# Patient Record
Sex: MALE | Race: WHITE | NOT HISPANIC OR LATINO | Employment: FULL TIME | URBAN - METROPOLITAN AREA
[De-identification: names, ages, dates, MRNs, and addresses within clinical notes are randomized per-mention and may not be internally consistent; named-entity substitution may affect disease eponyms.]

---

## 2019-06-09 ENCOUNTER — HOSPITAL ENCOUNTER (EMERGENCY)
Facility: HOSPITAL | Age: 44
Discharge: HOME | End: 2019-06-09
Attending: EMERGENCY MEDICINE | Admitting: EMERGENCY MEDICINE

## 2019-06-09 ENCOUNTER — APPOINTMENT (OUTPATIENT)
Dept: GENERAL RADIOLOGY | Facility: HOSPITAL | Age: 44
End: 2019-06-09

## 2019-06-09 VITALS
WEIGHT: 180 LBS | OXYGEN SATURATION: 94 % | HEIGHT: 70 IN | DIASTOLIC BLOOD PRESSURE: 111 MMHG | HEART RATE: 78 BPM | BODY MASS INDEX: 25.77 KG/M2 | SYSTOLIC BLOOD PRESSURE: 148 MMHG | TEMPERATURE: 98.7 F | RESPIRATION RATE: 20 BRPM

## 2019-06-09 DIAGNOSIS — R07.9 CHEST PAIN, UNSPECIFIED TYPE: Primary | ICD-10-CM

## 2019-06-09 DIAGNOSIS — K21.9 GASTROESOPHAGEAL REFLUX DISEASE, ESOPHAGITIS PRESENCE NOT SPECIFIED: ICD-10-CM

## 2019-06-09 LAB
ALBUMIN SERPL-MCNC: 4.8 G/DL (ref 3.5–5.2)
ALBUMIN/GLOB SERPL: 1.5 G/DL
ALP SERPL-CCNC: 89 U/L (ref 39–117)
ALT SERPL W P-5'-P-CCNC: 119 U/L (ref 1–41)
ANION GAP SERPL CALCULATED.3IONS-SCNC: 17 MMOL/L
AST SERPL-CCNC: 112 U/L (ref 1–40)
BASOPHILS # BLD AUTO: 0.05 10*3/MM3 (ref 0–0.2)
BASOPHILS NFR BLD AUTO: 1.2 % (ref 0–1.5)
BILIRUB SERPL-MCNC: 0.5 MG/DL (ref 0.2–1.2)
BUN BLD-MCNC: 8 MG/DL (ref 6–20)
BUN/CREAT SERPL: 9.2 (ref 7–25)
CALCIUM SPEC-SCNC: 9.3 MG/DL (ref 8.6–10.5)
CHLORIDE SERPL-SCNC: 100 MMOL/L (ref 98–107)
CO2 SERPL-SCNC: 25 MMOL/L (ref 22–29)
CREAT BLD-MCNC: 0.87 MG/DL (ref 0.76–1.27)
DEPRECATED RDW RBC AUTO: 43.7 FL (ref 37–54)
EOSINOPHIL # BLD AUTO: 0.1 10*3/MM3 (ref 0–0.4)
EOSINOPHIL NFR BLD AUTO: 2.5 % (ref 0.3–6.2)
ERYTHROCYTE [DISTWIDTH] IN BLOOD BY AUTOMATED COUNT: 12.6 % (ref 12.3–15.4)
GFR SERPL CREATININE-BSD FRML MDRD: 97 ML/MIN/1.73
GLOBULIN UR ELPH-MCNC: 3.1 GM/DL
GLUCOSE BLD-MCNC: 101 MG/DL (ref 65–99)
HCT VFR BLD AUTO: 49 % (ref 37.5–51)
HGB BLD-MCNC: 16.9 G/DL (ref 13–17.7)
HOLD SPECIMEN: NORMAL
HOLD SPECIMEN: NORMAL
IMM GRANULOCYTES # BLD AUTO: 0.01 10*3/MM3 (ref 0–0.05)
IMM GRANULOCYTES NFR BLD AUTO: 0.2 % (ref 0–0.5)
LYMPHOCYTES # BLD AUTO: 1.45 10*3/MM3 (ref 0.7–3.1)
LYMPHOCYTES NFR BLD AUTO: 36.1 % (ref 19.6–45.3)
MCH RBC QN AUTO: 32.2 PG (ref 26.6–33)
MCHC RBC AUTO-ENTMCNC: 34.5 G/DL (ref 31.5–35.7)
MCV RBC AUTO: 93.3 FL (ref 79–97)
MONOCYTES # BLD AUTO: 0.61 10*3/MM3 (ref 0.1–0.9)
MONOCYTES NFR BLD AUTO: 15.2 % (ref 5–12)
NEUTROPHILS # BLD AUTO: 1.8 10*3/MM3 (ref 1.7–7)
NEUTROPHILS NFR BLD AUTO: 44.8 % (ref 42.7–76)
NRBC BLD AUTO-RTO: 0 /100 WBC (ref 0–0.2)
NT-PROBNP SERPL-MCNC: 15.5 PG/ML (ref 5–450)
PLATELET # BLD AUTO: 165 10*3/MM3 (ref 140–450)
PMV BLD AUTO: 9.9 FL (ref 6–12)
POTASSIUM BLD-SCNC: 3.8 MMOL/L (ref 3.5–5.2)
PROT SERPL-MCNC: 7.9 G/DL (ref 6–8.5)
RBC # BLD AUTO: 5.25 10*6/MM3 (ref 4.14–5.8)
SODIUM BLD-SCNC: 142 MMOL/L (ref 136–145)
TROPONIN T SERPL-MCNC: <0.01 NG/ML (ref 0–0.03)
TROPONIN T SERPL-MCNC: <0.01 NG/ML (ref 0–0.03)
WBC NRBC COR # BLD: 4.02 10*3/MM3 (ref 3.4–10.8)
WHOLE BLOOD HOLD SPECIMEN: NORMAL
WHOLE BLOOD HOLD SPECIMEN: NORMAL

## 2019-06-09 PROCEDURE — 83880 ASSAY OF NATRIURETIC PEPTIDE: CPT

## 2019-06-09 PROCEDURE — 93005 ELECTROCARDIOGRAM TRACING: CPT | Performed by: EMERGENCY MEDICINE

## 2019-06-09 PROCEDURE — 80053 COMPREHEN METABOLIC PANEL: CPT

## 2019-06-09 PROCEDURE — 84484 ASSAY OF TROPONIN QUANT: CPT

## 2019-06-09 PROCEDURE — 99285 EMERGENCY DEPT VISIT HI MDM: CPT

## 2019-06-09 PROCEDURE — 84484 ASSAY OF TROPONIN QUANT: CPT | Performed by: EMERGENCY MEDICINE

## 2019-06-09 PROCEDURE — 93005 ELECTROCARDIOGRAM TRACING: CPT

## 2019-06-09 PROCEDURE — 85025 COMPLETE CBC W/AUTO DIFF WBC: CPT

## 2019-06-09 PROCEDURE — 71045 X-RAY EXAM CHEST 1 VIEW: CPT

## 2019-06-09 RX ORDER — SODIUM CHLORIDE 0.9 % (FLUSH) 0.9 %
10 SYRINGE (ML) INJECTION AS NEEDED
Status: DISCONTINUED | OUTPATIENT
Start: 2019-06-09 | End: 2019-06-10 | Stop reason: HOSPADM

## 2019-06-09 RX ORDER — FAMOTIDINE 20 MG/1
20 TABLET, FILM COATED ORAL 2 TIMES DAILY
Qty: 14 TABLET | Refills: 0 | Status: SHIPPED | OUTPATIENT
Start: 2019-06-09

## 2019-06-09 RX ORDER — ALUMINA, MAGNESIA, AND SIMETHICONE 2400; 2400; 240 MG/30ML; MG/30ML; MG/30ML
15 SUSPENSION ORAL ONCE
Status: COMPLETED | OUTPATIENT
Start: 2019-06-09 | End: 2019-06-09

## 2019-06-09 RX ADMIN — ALUMINUM HYDROXIDE, MAGNESIUM HYDROXIDE, AND DIMETHICONE 15 ML: 400; 400; 40 SUSPENSION ORAL at 19:44

## 2019-06-09 RX ADMIN — LIDOCAINE HYDROCHLORIDE 15 ML: 20 SOLUTION ORAL; TOPICAL at 19:45

## 2019-06-09 NOTE — ED PROVIDER NOTES
Karena Brooks is a 43 y.o.male who presents to the emergency department with complaints of shortness of breath. His shortness of breath has been waxing and waning for 3-4 days. He mentions chest pain that is intermittent. The patient has a history of hypertension, hyperlipidemia, and denies history of deep vein thrombosis, and pulmonary embolism. He reports that his father had a myocardial infarction when he was 60 years old. The patient smokes 3/4 a pack of cigarettes and drinks a pint of alcohol a day. He does not take any medications. There are no other acute complaints at this time.           History provided by:  Patient  Shortness of Breath   Severity:  Moderate  Onset quality:  Sudden  Duration:  4 days  Timing:  Intermittent  Progression:  Waxing and waning  Chronicity:  New  Associated symptoms: chest pain    Risk factors: alcohol use    Risk factors: no hx of PE/DVT        Review of Systems   Respiratory: Positive for shortness of breath.    Cardiovascular: Positive for chest pain.   All other systems reviewed and are negative.      Past Medical History:   Diagnosis Date   • Alcohol abuse    • Anxiety    • Hyperlipidemia    • Hypertension        No Known Allergies    Past Surgical History:   Procedure Laterality Date   • HERNIA REPAIR         History reviewed. No pertinent family history.    Social History     Socioeconomic History   • Marital status: Single     Spouse name: Not on file   • Number of children: Not on file   • Years of education: Not on file   • Highest education level: Not on file   Tobacco Use   • Smoking status: Current Every Day Smoker     Packs/day: 1.00     Types: Cigarettes   • Smokeless tobacco: Never Used   Substance and Sexual Activity   • Alcohol use: Yes     Comment: 1/2 [place] DAILY.    • Drug use: No   • Sexual activity: Yes     Partners: Female         Objective   Physical Exam   Constitutional: He is oriented to person, place, and time. He appears  well-developed and well-nourished. No distress.   HENT:   Head: Normocephalic and atraumatic.   Nose: Nose normal.   Eyes: Conjunctivae are normal. No scleral icterus.   Neck: Normal range of motion. Neck supple.   Cardiovascular: Normal rate, regular rhythm and normal heart sounds.   Pulmonary/Chest: Effort normal and breath sounds normal. No respiratory distress.   Abdominal: Soft. There is no tenderness.   Musculoskeletal: Normal range of motion.   Neurological: He is alert and oriented to person, place, and time.   Skin: Skin is warm and dry.   Psychiatric: He has a normal mood and affect. His behavior is normal.   Nursing note and vitals reviewed.      Procedures         ED Course  ED Course as of Jun 09 2307   Sun Jun 09, 2019 2300 I discussed the plan with the patient  [JI]   2306 HEART SCORE 1  [JI]      ED Course User Index  [JI] Michael Chaudhari PA     Recent Results (from the past 24 hour(s))   Comprehensive Metabolic Panel    Collection Time: 06/09/19  7:55 PM   Result Value Ref Range    Glucose 101 (H) 65 - 99 mg/dL    BUN 8 6 - 20 mg/dL    Creatinine 0.87 0.76 - 1.27 mg/dL    Sodium 142 136 - 145 mmol/L    Potassium 3.8 3.5 - 5.2 mmol/L    Chloride 100 98 - 107 mmol/L    CO2 25.0 22.0 - 29.0 mmol/L    Calcium 9.3 8.6 - 10.5 mg/dL    Total Protein 7.9 6.0 - 8.5 g/dL    Albumin 4.80 3.50 - 5.20 g/dL    ALT (SGPT) 119 (H) 1 - 41 U/L    AST (SGOT) 112 (H) 1 - 40 U/L    Alkaline Phosphatase 89 39 - 117 U/L    Total Bilirubin 0.5 0.2 - 1.2 mg/dL    eGFR Non African Amer 97 >60 mL/min/1.73    Globulin 3.1 gm/dL    A/G Ratio 1.5 g/dL    BUN/Creatinine Ratio 9.2 7.0 - 25.0    Anion Gap 17.0 mmol/L   BNP    Collection Time: 06/09/19  7:55 PM   Result Value Ref Range    proBNP 15.5 5.0 - 450.0 pg/mL   Troponin    Collection Time: 06/09/19  7:55 PM   Result Value Ref Range    Troponin T <0.010 0.000 - 0.030 ng/mL   Light Blue Top    Collection Time: 06/09/19  7:55 PM   Result Value Ref Range    Extra Tube hold for  add-on    Green Top (Gel)    Collection Time: 06/09/19  7:55 PM   Result Value Ref Range    Extra Tube Hold for add-ons.    Lavender Top    Collection Time: 06/09/19  7:55 PM   Result Value Ref Range    Extra Tube hold for add-on    Gold Top - SST    Collection Time: 06/09/19  7:55 PM   Result Value Ref Range    Extra Tube Hold for add-ons.    CBC Auto Differential    Collection Time: 06/09/19  7:55 PM   Result Value Ref Range    WBC 4.02 3.40 - 10.80 10*3/mm3    RBC 5.25 4.14 - 5.80 10*6/mm3    Hemoglobin 16.9 13.0 - 17.7 g/dL    Hematocrit 49.0 37.5 - 51.0 %    MCV 93.3 79.0 - 97.0 fL    MCH 32.2 26.6 - 33.0 pg    MCHC 34.5 31.5 - 35.7 g/dL    RDW 12.6 12.3 - 15.4 %    RDW-SD 43.7 37.0 - 54.0 fl    MPV 9.9 6.0 - 12.0 fL    Platelets 165 140 - 450 10*3/mm3    Neutrophil % 44.8 42.7 - 76.0 %    Lymphocyte % 36.1 19.6 - 45.3 %    Monocyte % 15.2 (H) 5.0 - 12.0 %    Eosinophil % 2.5 0.3 - 6.2 %    Basophil % 1.2 0.0 - 1.5 %    Immature Grans % 0.2 0.0 - 0.5 %    Neutrophils, Absolute 1.80 1.70 - 7.00 10*3/mm3    Lymphocytes, Absolute 1.45 0.70 - 3.10 10*3/mm3    Monocytes, Absolute 0.61 0.10 - 0.90 10*3/mm3    Eosinophils, Absolute 0.10 0.00 - 0.40 10*3/mm3    Basophils, Absolute 0.05 0.00 - 0.20 10*3/mm3    Immature Grans, Absolute 0.01 0.00 - 0.05 10*3/mm3    nRBC 0.0 0.0 - 0.2 /100 WBC   Troponin    Collection Time: 06/09/19  9:49 PM   Result Value Ref Range    Troponin T <0.010 0.000 - 0.030 ng/mL     Note: In addition to lab results from this visit, the labs listed above may include labs taken at another facility or during a different encounter within the last 24 hours. Please correlate lab times with ED admission and discharge times for further clarification of the services performed during this visit.    XR Chest 1 View   Final Result   No acute cardiopulmonary findings.      Signer Name: Gael Sheets MD    Signed: 6/9/2019 7:51 PM    Workstation Name: [id number]            Vitals:    06/09/19 1920 06/09/19  1956 06/09/19 2109 06/09/19 2152   BP: (!) 147/111 (!) 139/107 (!) 151/104 (!) 137/105   BP Location:    Right arm   Patient Position:    Sitting   Pulse: 51 80 79 72   Resp:    20   Temp:       TempSrc:       SpO2: 95%  96% 96%   Weight:       Height:         Medications   sodium chloride 0.9 % flush 10 mL (not administered)   nitroglycerin (NITROSTAT) ointment 1 inch (not administered)   aluminum-magnesium hydroxide-simethicone (MAALOX MAX) 400-400-40 MG/5ML suspension 15 mL (15 mL Oral Given 6/9/19 1944)   lidocaine viscous (XYLOCAINE) 2 % mouth solution 15 mL (15 mL Mouth/Throat Given 6/9/19 1945)     ECG/EMG Results (last 24 hours)     Procedure Component Value Units Date/Time    ECG 12 Lead [[id number]] Collected:  06/09/19 1903     Updated:  06/09/19 1903        ECG 12 Lead         ECG 12 Lead             Recent Results (from the past 24 hour(s))   Comprehensive Metabolic Panel    Collection Time: 06/09/19  7:55 PM   Result Value Ref Range    Glucose 101 (H) 65 - 99 mg/dL    BUN 8 6 - 20 mg/dL    Creatinine 0.87 0.76 - 1.27 mg/dL    Sodium 142 136 - 145 mmol/L    Potassium 3.8 3.5 - 5.2 mmol/L    Chloride 100 98 - 107 mmol/L    CO2 25.0 22.0 - 29.0 mmol/L    Calcium 9.3 8.6 - 10.5 mg/dL    Total Protein 7.9 6.0 - 8.5 g/dL    Albumin 4.80 3.50 - 5.20 g/dL    ALT (SGPT) 119 (H) 1 - 41 U/L    AST (SGOT) 112 (H) 1 - 40 U/L    Alkaline Phosphatase 89 39 - 117 U/L    Total Bilirubin 0.5 0.2 - 1.2 mg/dL    eGFR Non African Amer 97 >60 mL/min/1.73    Globulin 3.1 gm/dL    A/G Ratio 1.5 g/dL    BUN/Creatinine Ratio 9.2 7.0 - 25.0    Anion Gap 17.0 mmol/L   BNP    Collection Time: 06/09/19  7:55 PM   Result Value Ref Range    proBNP 15.5 5.0 - 450.0 pg/mL   Troponin    Collection Time: 06/09/19  7:55 PM   Result Value Ref Range    Troponin T <0.010 0.000 - 0.030 ng/mL   Light Blue Top    Collection Time: 06/09/19  7:55 PM   Result Value Ref Range    Extra Tube hold for add-on    Green Top (Gel)    Collection Time:  06/09/19  7:55 PM   Result Value Ref Range    Extra Tube Hold for add-ons.    Lavender Top    Collection Time: 06/09/19  7:55 PM   Result Value Ref Range    Extra Tube hold for add-on    Gold Top - SST    Collection Time: 06/09/19  7:55 PM   Result Value Ref Range    Extra Tube Hold for add-ons.    CBC Auto Differential    Collection Time: 06/09/19  7:55 PM   Result Value Ref Range    WBC 4.02 3.40 - 10.80 10*3/mm3    RBC 5.25 4.14 - 5.80 10*6/mm3    Hemoglobin 16.9 13.0 - 17.7 g/dL    Hematocrit 49.0 37.5 - 51.0 %    MCV 93.3 79.0 - 97.0 fL    MCH 32.2 26.6 - 33.0 pg    MCHC 34.5 31.5 - 35.7 g/dL    RDW 12.6 12.3 - 15.4 %    RDW-SD 43.7 37.0 - 54.0 fl    MPV 9.9 6.0 - 12.0 fL    Platelets 165 140 - 450 10*3/mm3    Neutrophil % 44.8 42.7 - 76.0 %    Lymphocyte % 36.1 19.6 - 45.3 %    Monocyte % 15.2 (H) 5.0 - 12.0 %    Eosinophil % 2.5 0.3 - 6.2 %    Basophil % 1.2 0.0 - 1.5 %    Immature Grans % 0.2 0.0 - 0.5 %    Neutrophils, Absolute 1.80 1.70 - 7.00 10*3/mm3    Lymphocytes, Absolute 1.45 0.70 - 3.10 10*3/mm3    Monocytes, Absolute 0.61 0.10 - 0.90 10*3/mm3    Eosinophils, Absolute 0.10 0.00 - 0.40 10*3/mm3    Basophils, Absolute 0.05 0.00 - 0.20 10*3/mm3    Immature Grans, Absolute 0.01 0.00 - 0.05 10*3/mm3    nRBC 0.0 0.0 - 0.2 /100 WBC   Troponin    Collection Time: 06/09/19  9:49 PM   Result Value Ref Range    Troponin T <0.010 0.000 - 0.030 ng/mL     Note: In addition to lab results from this visit, the labs listed above may include labs taken at another facility or during a different encounter within the last 24 hours. Please correlate lab times with ED admission and discharge times for further clarification of the services performed during this visit.    XR Chest 1 View   Final Result   No acute cardiopulmonary findings.      Signer Name: Gael Sheets MD    Signed: 6/9/2019 7:51 PM    Workstation Name: [id number]            Vitals:    06/09/19 1920 06/09/19 1956 06/09/19 2109 06/09/19 2152   BP: (!)  147/111 (!) 139/107 (!) 151/104 (!) 137/105   BP Location:    Right arm   Patient Position:    Sitting   Pulse: 51 80 79 72   Resp:    20   Temp:       TempSrc:       SpO2: 95%  96% 96%   Weight:       Height:         Medications   sodium chloride 0.9 % flush 10 mL (not administered)   nitroglycerin (NITROSTAT) ointment 1 inch (not administered)   aluminum-magnesium hydroxide-simethicone (MAALOX MAX) 400-400-40 MG/5ML suspension 15 mL (15 mL Oral Given 6/9/19 1944)   lidocaine viscous (XYLOCAINE) 2 % mouth solution 15 mL (15 mL Mouth/Throat Given 6/9/19 1945)     ECG/EMG Results (last 24 hours)     Procedure Component Value Units Date/Time    ECG 12 Lead [[id number]] Collected:  06/09/19 1903     Updated:  06/09/19 1903    ECG 12 Lead [[id number]] Collected:  06/09/19 2147     Updated:  06/09/19 2147        ECG 12 Lead         ECG 12 Lead                             MDM    Final diagnoses:   Chest pain, unspecified type   Gastroesophageal reflux disease, esophagitis presence not specified       Documentation assistance provided by alexandra Lamas.  Information recorded by the alexandra was done at my direction and has been verified and validated by me.     Baldomero Lamas  06/09/19 1934       Michael Chaudhari PA  06/09/19 2304       Michael Chaudhari PA  06/09/19 2307

## 2019-06-10 NOTE — DISCHARGE INSTRUCTIONS
Take an enteric-coated aspirin 81 mg daily.  You must check your blood pressure daily, take your blood pressure  medication as prescribed and follow-up with your doctor as additional treatment may be required.

## 2019-06-14 ENCOUNTER — APPOINTMENT (OUTPATIENT)
Dept: LAB | Facility: HOSPITAL | Age: 44
End: 2019-06-14

## 2019-06-14 ENCOUNTER — HOSPITAL ENCOUNTER (OUTPATIENT)
Dept: CARDIOLOGY | Facility: HOSPITAL | Age: 44
Discharge: HOME | End: 2019-06-14
Admitting: NURSE PRACTITIONER

## 2019-06-14 ENCOUNTER — LAB (OUTPATIENT)
Dept: LAB | Facility: HOSPITAL | Age: 44
End: 2019-06-14

## 2019-06-14 ENCOUNTER — OFFICE VISIT (OUTPATIENT)
Dept: CARDIOLOGY | Facility: HOSPITAL | Age: 44
End: 2019-06-14

## 2019-06-14 VITALS
BODY MASS INDEX: 25.95 KG/M2 | DIASTOLIC BLOOD PRESSURE: 93 MMHG | OXYGEN SATURATION: 98 % | WEIGHT: 181.25 LBS | SYSTOLIC BLOOD PRESSURE: 137 MMHG | RESPIRATION RATE: 20 BRPM | HEART RATE: 87 BPM | TEMPERATURE: 97.9 F | HEIGHT: 70 IN

## 2019-06-14 DIAGNOSIS — R07.9 CHEST PAIN, UNSPECIFIED TYPE: ICD-10-CM

## 2019-06-14 DIAGNOSIS — K21.9 GASTROESOPHAGEAL REFLUX DISEASE, ESOPHAGITIS PRESENCE NOT SPECIFIED: ICD-10-CM

## 2019-06-14 DIAGNOSIS — R00.2 PALPITATIONS: ICD-10-CM

## 2019-06-14 DIAGNOSIS — R06.02 SHORTNESS OF BREATH: ICD-10-CM

## 2019-06-14 DIAGNOSIS — R07.9 CHEST PAIN, UNSPECIFIED TYPE: Primary | ICD-10-CM

## 2019-06-14 DIAGNOSIS — R94.39 EQUIVOCAL STRESS TEST: ICD-10-CM

## 2019-06-14 DIAGNOSIS — F10.10 ALCOHOL ABUSE: ICD-10-CM

## 2019-06-14 DIAGNOSIS — I10 ESSENTIAL HYPERTENSION: ICD-10-CM

## 2019-06-14 DIAGNOSIS — R06.83 SNORING: ICD-10-CM

## 2019-06-14 DIAGNOSIS — E78.5 HYPERLIPIDEMIA, UNSPECIFIED HYPERLIPIDEMIA TYPE: ICD-10-CM

## 2019-06-14 DIAGNOSIS — I10 ESSENTIAL HYPERTENSION: Primary | ICD-10-CM

## 2019-06-14 LAB
BH CV STRESS BP STAGE 1: NORMAL
BH CV STRESS BP STAGE 2: NORMAL
BH CV STRESS DURATION MIN STAGE 1: 3
BH CV STRESS DURATION MIN STAGE 2: 2
BH CV STRESS DURATION SEC STAGE 1: 0
BH CV STRESS DURATION SEC STAGE 2: 1
BH CV STRESS GRADE STAGE 1: 10
BH CV STRESS GRADE STAGE 2: 12
BH CV STRESS HR STAGE 1: 125
BH CV STRESS HR STAGE 2: 141
BH CV STRESS METS STAGE 1: 5
BH CV STRESS METS STAGE 2: 7.5
BH CV STRESS PROTOCOL 1: NORMAL
BH CV STRESS RECOVERY BP: NORMAL MMHG
BH CV STRESS RECOVERY HR: 92 BPM
BH CV STRESS SPEED STAGE 1: 1.7
BH CV STRESS SPEED STAGE 2: 2.5
BH CV STRESS STAGE 1: 1
BH CV STRESS STAGE 2: 2
CHOLEST SERPL-MCNC: 226 MG/DL (ref 0–200)
HDLC SERPL-MCNC: 67 MG/DL (ref 40–60)
LDLC SERPL CALC-MCNC: 141 MG/DL (ref 0–100)
LDLC/HDLC SERPL: 2.11 {RATIO}
MAXIMAL PREDICTED HEART RATE: 179 BPM
PERCENT MAX PREDICTED HR: 78.77 %
STRESS BASELINE BP: NORMAL MMHG
STRESS BASELINE HR: 90 BPM
STRESS PERCENT HR: 93 %
STRESS POST ESTIMATED WORKLOAD: 7 METS
STRESS POST EXERCISE DUR MIN: 5 MIN
STRESS POST EXERCISE DUR SEC: 1 SEC
STRESS POST PEAK BP: NORMAL MMHG
STRESS POST PEAK HR: 141 BPM
STRESS TARGET HR: 152 BPM
TRIGL SERPL-MCNC: 89 MG/DL (ref 0–150)
VLDLC SERPL-MCNC: 17.8 MG/DL (ref 5–40)

## 2019-06-14 PROCEDURE — 99214 OFFICE O/P EST MOD 30 MIN: CPT | Performed by: NURSE PRACTITIONER

## 2019-06-14 PROCEDURE — 93018 CV STRESS TEST I&R ONLY: CPT | Performed by: INTERNAL MEDICINE

## 2019-06-14 PROCEDURE — 80061 LIPID PANEL: CPT

## 2019-06-14 PROCEDURE — 93017 CV STRESS TEST TRACING ONLY: CPT

## 2019-06-14 PROCEDURE — 36415 COLL VENOUS BLD VENIPUNCTURE: CPT

## 2019-06-14 RX ORDER — LISINOPRIL 10 MG/1
10 TABLET ORAL DAILY
Refills: 0 | COMMUNITY
Start: 2019-06-11

## 2019-06-14 RX ORDER — ATORVASTATIN CALCIUM 10 MG/1
10 TABLET, FILM COATED ORAL DAILY
Refills: 0 | COMMUNITY
Start: 2019-06-11

## 2019-06-14 RX ORDER — AMOXICILLIN AND CLAVULANATE POTASSIUM 875; 125 MG/1; MG/1
1 TABLET, FILM COATED ORAL 2 TIMES DAILY
Refills: 0 | COMMUNITY
Start: 2019-06-11 | End: 2019-06-21

## 2019-06-14 RX ORDER — VENLAFAXINE HYDROCHLORIDE 37.5 MG/1
37.5 CAPSULE, EXTENDED RELEASE ORAL DAILY
Refills: 1 | COMMUNITY
Start: 2019-06-11

## 2019-06-14 RX ORDER — CLONAZEPAM 1 MG/1
1 TABLET ORAL 2 TIMES DAILY PRN
Refills: 1 | COMMUNITY
Start: 2019-06-11

## 2019-06-14 RX ORDER — METOPROLOL SUCCINATE 25 MG/1
25 TABLET, EXTENDED RELEASE ORAL DAILY
Qty: 30 TABLET | Refills: 3 | Status: SHIPPED | OUTPATIENT
Start: 2019-06-14 | End: 2019-06-19 | Stop reason: SDUPTHER

## 2019-06-14 RX ORDER — BUSPIRONE HYDROCHLORIDE 5 MG/1
5 TABLET ORAL 3 TIMES DAILY PRN
Refills: 1 | COMMUNITY
Start: 2019-06-11

## 2019-06-14 NOTE — PROGRESS NOTES
Called and reviewed stress test    Equivocal study.  Test stopped due to blood pressure response    Patient will add metoprolol XL 25 mg daily.     Reviewed lipid panel.  Elevated, but liver enzymes are elevated.  No baseline.  History of alcohol abuse.  Will hold off on statin at this time.  Request previous liver enzyme results from primary care provider.    Patient has had nausea, dyspepsia, heartburn-like symptoms.  Patient instructed to take acid reducer daily.  Will then consider adding aspirin.    Patient will be scheduled for echocardiogram.  Will schedule follow-up with [facility] for continued management.

## 2019-06-14 NOTE — PROGRESS NOTES
[facility]      Encounter Date:06/14/2019     [address] [place] [address]  [unfilled]    1975    Ricco Minor MD    Mata Brooks is a 43 y.o. male.      Subjective:     Chief Complaint:  Chest Pain       HPI     43-year-old female who presented to [facility] ED on 6/9/2019 with complaints of shortness of breath.  Intermittent, waxing and waning for greater than a week.  Associated with intermittent chest discomfort/pain, diaphoresis.   Pt reports about 3 weeks ago he had been working out in the heat starting vomiting.  Reported did not feel well for several days.    History of hypertension, hyperlipidemia.  No history of DVT, CVA, PE, arrhythmia.  Father had MI at age 60.  Patient smokes 3/4 pack a day and drinks a pint of alcohol a day.  Typically no medications.  Prescribed Pepcid during ED visit.  Recently started on meds for anxiety.  Report occasional dizziness and palpitations.  Not associated with recent CP and dyspnea. NO hx of arrhythmias.  Hx of snoring.  NO sleep study.       Cardiac risk factors:  History of dyslipidemia, hypertension,  Tobacco use  Daily ETOH use    Past Medical History:   Diagnosis Date   • Alcohol abuse    • Anxiety    • Hyperlipidemia    • Hypertension            Past Surgical History:   Procedure Laterality Date   • HERNIA REPAIR         No Known Allergies      Current Outpatient Medications:   •  amoxicillin-clavulanate (AUGMENTIN) 875-125 MG per tablet, Take 1 tablet by mouth 2 (Two) Times a Day., Disp: , Rfl: 0  •  atorvastatin (LIPITOR) 10 MG tablet, Take 10 mg by mouth Daily., Disp: , Rfl: 0  •  busPIRone (BUSPAR) 5 MG tablet, Take 5 mg by mouth 3 (Three) Times a Day As Needed., Disp: , Rfl: 1  •  clonazePAM (KlonoPIN) 1 MG tablet, Take 1 mg by mouth 2 (Two) Times a Day As Needed., Disp: , Rfl: 1  •  lisinopril (PRINIVIL,ZESTRIL) 10 MG tablet, Take 10 mg by mouth Daily., Disp: , Rfl: 0  •   "venlafaxine XR (EFFEXOR-XR) 37.5 MG 24 hr capsule, Take 37.5 mg by mouth Daily., Disp: , Rfl: 1  •  famotidine (PEPCID) 20 MG tablet, Take 1 tablet by mouth 2 (Two) Times a Day., Disp: 14 tablet, Rfl: 0    The following portions of the patient's history were reviewed and updated today during office visit as appropriate: allergies, current medications, past family history, past medical history, past social history, past surgical history and problem list.    Review of Systems   Cardiovascular: Positive for chest pain.   Respiratory: Positive for shortness of breath.    Gastrointestinal: Positive for heartburn.   All other systems reviewed and are negative.      Objective:     Vitals:    06/14/19 0917 06/14/19 0919 06/14/19 0920   BP: 145/91 142/89 137/93   BP Location: Right arm Left arm Left arm   Patient Position: Sitting Sitting Standing   Cuff Size: Adult Adult Adult   Pulse: 75 72 87   Resp: 20     Temp: 97.9 °F (36.6 °C)     TempSrc: Temporal     SpO2: 95%  98%   Weight: 82.2 kg (181 lb 4 oz)     Height: 177.8 cm (70\")           Physical Exam   Constitutional: He is oriented to person, place, and time. He appears well-developed and well-nourished. No distress.   Eyes: No scleral icterus.   Neck: No hepatojugular reflux and no JVD present. Carotid bruit is not present. No tracheal deviation present. No thyromegaly present.   Cardiovascular: Normal rate, regular rhythm, normal heart sounds and intact distal pulses. Exam reveals no friction rub.   No murmur heard.  Pulmonary/Chest: Effort normal and breath sounds normal.   Abdominal: Soft. Bowel sounds are normal. He exhibits no distension. There is no tenderness.   Musculoskeletal: He exhibits no edema.   Lymphadenopathy:     He has no cervical adenopathy.   Neurological: He is alert and oriented to person, place, and time.   Skin: Skin is warm, dry and intact. No rash noted. No cyanosis or erythema. No pallor.   Psychiatric: He has a normal mood and affect. His " behavior is normal. Thought content normal.   Vitals reviewed.      Lab and Diagnostic Review:  Admission on 06/09/2019, Discharged on 06/09/2019   Component Date Value Ref Range Status   • Glucose 06/09/2019 101* 65 - 99 mg/dL Final   • BUN 06/09/2019 8  6 - 20 mg/dL Final   • Creatinine 06/09/2019 0.87  0.76 - 1.27 mg/dL Final   • Sodium 06/09/2019 142  136 - 145 mmol/L Final   • Potassium 06/09/2019 3.8  3.5 - 5.2 mmol/L Final   • Chloride 06/09/2019 100  98 - 107 mmol/L Final   • CO2 06/09/2019 25.0  22.0 - 29.0 mmol/L Final   • Calcium 06/09/2019 9.3  8.6 - 10.5 mg/dL Final   • Total Protein 06/09/2019 7.9  6.0 - 8.5 g/dL Final   • Albumin 06/09/2019 4.80  3.50 - 5.20 g/dL Final   • ALT (SGPT) 06/09/2019 119* 1 - 41 U/L Final   • AST (SGOT) 06/09/2019 112* 1 - 40 U/L Final   • Alkaline Phosphatase 06/09/2019 89  39 - 117 U/L Final   • Total Bilirubin 06/09/2019 0.5  0.2 - 1.2 mg/dL Final   • eGFR Non African Amer 06/09/2019 97  >60 mL/min/1.73 Final   • Globulin 06/09/2019 3.1  gm/dL Final   • A/G Ratio 06/09/2019 1.5  g/dL Final   • BUN/Creatinine Ratio 06/09/2019 9.2  7.0 - 25.0 Final   • Anion Gap 06/09/2019 17.0  mmol/L Final   • proBNP 06/09/2019 15.5  5.0 - 450.0 pg/mL Final   • Troponin T 06/09/2019 <0.010  0.000 - 0.030 ng/mL Final   • Extra Tube 06/09/2019 hold for add-on   Final    Auto resulted   • Extra Tube 06/09/2019 Hold for add-ons.   Final    Auto resulted.   • Extra Tube 06/09/2019 hold for add-on   Final    Auto resulted   • Extra Tube 06/09/2019 Hold for add-ons.   Final    Auto resulted.   • WBC 06/09/2019 4.02  3.40 - 10.80 10*3/mm3 Final   • RBC 06/09/2019 5.25  4.14 - 5.80 10*6/mm3 Final   • Hemoglobin 06/09/2019 16.9  13.0 - 17.7 g/dL Final   • Hematocrit 06/09/2019 49.0  37.5 - 51.0 % Final   • MCV 06/09/2019 93.3  79.0 - 97.0 fL Final   • MCH 06/09/2019 32.2  26.6 - 33.0 pg Final   • MCHC 06/09/2019 34.5  31.5 - 35.7 g/dL Final   • RDW 06/09/2019 12.6  12.3 - 15.4 % Final   • RDW-SD  06/09/2019 43.7  37.0 - 54.0 fl Final   • MPV 06/09/2019 9.9  6.0 - 12.0 fL Final   • Platelets 06/09/2019 165  140 - 450 10*3/mm3 Final   • Neutrophil % 06/09/2019 44.8  42.7 - 76.0 % Final   • Lymphocyte % 06/09/2019 36.1  19.6 - 45.3 % Final   • Monocyte % 06/09/2019 15.2* 5.0 - 12.0 % Final   • Eosinophil % 06/09/2019 2.5  0.3 - 6.2 % Final   • Basophil % 06/09/2019 1.2  0.0 - 1.5 % Final   • Immature Grans % 06/09/2019 0.2  0.0 - 0.5 % Final   • Neutrophils, Absolute 06/09/2019 1.80  1.70 - 7.00 10*3/mm3 Final   • Lymphocytes, Absolute 06/09/2019 1.45  0.70 - 3.10 10*3/mm3 Final   • Monocytes, Absolute 06/09/2019 0.61  0.10 - 0.90 10*3/mm3 Final   • Eosinophils, Absolute 06/09/2019 0.10  0.00 - 0.40 10*3/mm3 Final   • Basophils, Absolute 06/09/2019 0.05  0.00 - 0.20 10*3/mm3 Final   • Immature Grans, Absolute 06/09/2019 0.01  0.00 - 0.05 10*3/mm3 Final   • nRBC 06/09/2019 0.0  0.0 - 0.2 /100 WBC Final   • Troponin T 06/09/2019 <0.010  0.000 - 0.030 ng/mL Final     EKG 6/9/2019: Normal sinus rhythm with sinus arrhythmia 68 bpm,     6/9/2019 chest x-ray: No acute cardiopulmonary process  Assessment and Plan:         1. Chest pain, unspecified type    - Treadmill Stress Test; Future    2. Shortness of breath    - Treadmill Stress Test; Future  -echo  3. Essential hypertension  Elevated today  Lisinopril    4. Hyperlipidemia, unspecified hyperlipidemia type  Not treated  - Lipid Panel; Future    5. Alcohol abuse  Discussed risk factors associated with daily ETOH use  Pt is working with PCP for treatment of stress/anxiety  Encouraged to decrease daily consumption     6. Gastroesophageal reflux disease, esophagitis presence not specified  Continue H2 blocker    7. Palpitations and dizziness  May need holter/event to assess for arrythmias    8.  Hx of snoring  ? DAXA.  May need sleep study. To discuss at f/u.     F/u 6 weeks or sooner if needed.         *Please note that portions of this note were completed with a  voice recognition program. Efforts were made to edit the dictations, but occasionally words are mistranscribed.

## 2019-06-17 ENCOUNTER — DOCUMENTATION (OUTPATIENT)
Dept: CARDIOLOGY | Facility: HOSPITAL | Age: 44
End: 2019-06-17

## 2019-06-17 NOTE — PROGRESS NOTES
Kevin Brooksphoebe [place]a   Male, 43 y.o., 1975  Weight:   82.2 kg (181 lb 4 oz)  Phone:   [phone] (H)  PCP:   Ricco Minor MD  MRN:   [id number]  MyChart:   Pending  Next Appt:   07/19/2019  Message   Received: 3 days ago   Message Contents   Awilda Colón APRN Morrison, Shanell LUCERO, MA             Last cbc, cmp, tsh, lipids, magnesium from PCP for review.    Comments     6/17/19 0820 Requested labs from PCP    0833 Received and given to Isatu's inbox

## 2019-06-18 ENCOUNTER — TELEPHONE (OUTPATIENT)
Dept: CARDIOLOGY | Facility: HOSPITAL | Age: 44
End: 2019-06-18

## 2019-06-18 ENCOUNTER — DOCUMENTATION (OUTPATIENT)
Dept: CARDIOLOGY | Facility: HOSPITAL | Age: 44
End: 2019-06-18

## 2019-06-18 PROBLEM — R07.9 CHEST PAIN: Status: ACTIVE | Noted: 2019-06-18

## 2019-06-18 NOTE — TELEPHONE ENCOUNTER
Pt will be scheduled f/u with Dr. Damico.  Echo be schedules same day if possible due to travel concerns.     ----- Message from Fernando Damico III, MD sent at 6/14/2019  4:41 PM EDT -----  Let us wait till I see him in the office.  His EKG changes were likely due to hypertension.  ----- Message -----  From: Awilda Colón APRN  Sent: 6/14/2019   4:09 PM  To: Fernando Damico III, MD    I saw stress test results.  Equivocal study.  Test stopped due to elevated blood pressure.    I am going to start beta-blocker for palpitations, blood pressure,Chest pain.  I am holding off on a statin due to elevated liver enzymes and alcohol use.  I am ordering an echo.  I am also holding off on the aspirin right now because of his nausea.  I have instructed him to take his Pepcid or PPI daily.  Would you want me to schedule a nuclear stress test or weight until you see him in clinic?

## 2019-06-18 NOTE — PROGRESS NOTES
Received labs completed by primary care provider on 6/11/2019    Estimated , glucose 109, sodium 138, potassium 3.9, chloride 97, carbon DEXA 23, BUN 7, creatinine 0.9, , , alkaline Selina 104    Hepatitis panel negative    WBC 3.2, RBC 5.1, hemoglobin 15.5, hematocrit 49.2    Free T4 1.3, TSH 2.5    Iron saturation 38%, iron 123, total iron-binding capacity 335

## 2019-06-19 DIAGNOSIS — I10 ESSENTIAL HYPERTENSION: ICD-10-CM

## 2019-06-19 DIAGNOSIS — R00.2 PALPITATIONS: ICD-10-CM

## 2019-06-19 DIAGNOSIS — R07.9 CHEST PAIN, UNSPECIFIED TYPE: ICD-10-CM

## 2019-06-19 RX ORDER — METOPROLOL SUCCINATE 25 MG/1
25 TABLET, EXTENDED RELEASE ORAL 2 TIMES DAILY
Qty: 60 TABLET | Refills: 3 | Status: SHIPPED | OUTPATIENT
Start: 2019-06-19

## 2019-06-19 NOTE — PROGRESS NOTES
Called to f/u on s/s after starting meds.  Pt reports no further CP or pressure.      He will increase metoprolol succinate 25 mg BID    Continue lisinopril    Keep BP and HR log at home.  Call with any concerns.  F/u with Dr. Damico and complete echo as scheduled.

## 2019-07-15 ENCOUNTER — APPOINTMENT (OUTPATIENT)
Dept: CARDIOLOGY | Facility: HOSPITAL | Age: 44
End: 2019-07-15

## 2019-07-19 ENCOUNTER — APPOINTMENT (OUTPATIENT)
Dept: CARDIOLOGY | Facility: HOSPITAL | Age: 44
End: 2019-07-19